# Patient Record
Sex: MALE | Race: WHITE | NOT HISPANIC OR LATINO | Employment: UNEMPLOYED | URBAN - METROPOLITAN AREA
[De-identification: names, ages, dates, MRNs, and addresses within clinical notes are randomized per-mention and may not be internally consistent; named-entity substitution may affect disease eponyms.]

---

## 2021-07-23 ENCOUNTER — HOSPITAL ENCOUNTER (EMERGENCY)
Facility: MEDICAL CENTER | Age: 51
End: 2021-07-23
Attending: EMERGENCY MEDICINE
Payer: COMMERCIAL

## 2021-07-23 VITALS
SYSTOLIC BLOOD PRESSURE: 118 MMHG | WEIGHT: 162.7 LBS | HEIGHT: 71 IN | RESPIRATION RATE: 16 BRPM | TEMPERATURE: 97.4 F | OXYGEN SATURATION: 100 % | HEART RATE: 59 BPM | BODY MASS INDEX: 22.78 KG/M2 | DIASTOLIC BLOOD PRESSURE: 70 MMHG

## 2021-07-23 DIAGNOSIS — R20.2 PARESTHESIAS: ICD-10-CM

## 2021-07-23 LAB
ALBUMIN SERPL BCP-MCNC: 4.8 G/DL (ref 3.2–4.9)
ALBUMIN/GLOB SERPL: 1.7 G/DL
ALP SERPL-CCNC: 66 U/L (ref 30–99)
ALT SERPL-CCNC: 19 U/L (ref 2–50)
ANION GAP SERPL CALC-SCNC: 10 MMOL/L (ref 7–16)
AST SERPL-CCNC: 19 U/L (ref 12–45)
BASOPHILS # BLD AUTO: 0.8 % (ref 0–1.8)
BASOPHILS # BLD: 0.05 K/UL (ref 0–0.12)
BILIRUB SERPL-MCNC: 0.7 MG/DL (ref 0.1–1.5)
BUN SERPL-MCNC: 15 MG/DL (ref 8–22)
CALCIUM SERPL-MCNC: 9.8 MG/DL (ref 8.4–10.2)
CHLORIDE SERPL-SCNC: 102 MMOL/L (ref 96–112)
CO2 SERPL-SCNC: 27 MMOL/L (ref 20–33)
CREAT SERPL-MCNC: 0.68 MG/DL (ref 0.5–1.4)
EOSINOPHIL # BLD AUTO: 0.16 K/UL (ref 0–0.51)
EOSINOPHIL NFR BLD: 2.5 % (ref 0–6.9)
ERYTHROCYTE [DISTWIDTH] IN BLOOD BY AUTOMATED COUNT: 41.5 FL (ref 35.9–50)
GLOBULIN SER CALC-MCNC: 2.9 G/DL (ref 1.9–3.5)
GLUCOSE SERPL-MCNC: 70 MG/DL (ref 65–99)
HCT VFR BLD AUTO: 45.5 % (ref 42–52)
HGB BLD-MCNC: 15.6 G/DL (ref 14–18)
IMM GRANULOCYTES # BLD AUTO: 0.02 K/UL (ref 0–0.11)
IMM GRANULOCYTES NFR BLD AUTO: 0.3 % (ref 0–0.9)
LYMPHOCYTES # BLD AUTO: 2.14 K/UL (ref 1–4.8)
LYMPHOCYTES NFR BLD: 33.1 % (ref 22–41)
MCH RBC QN AUTO: 32 PG (ref 27–33)
MCHC RBC AUTO-ENTMCNC: 34.3 G/DL (ref 33.7–35.3)
MCV RBC AUTO: 93.2 FL (ref 81.4–97.8)
MONOCYTES # BLD AUTO: 0.61 K/UL (ref 0–0.85)
MONOCYTES NFR BLD AUTO: 9.4 % (ref 0–13.4)
NEUTROPHILS # BLD AUTO: 3.49 K/UL (ref 1.82–7.42)
NEUTROPHILS NFR BLD: 53.9 % (ref 44–72)
NRBC # BLD AUTO: 0 K/UL
NRBC BLD-RTO: 0 /100 WBC
PLATELET # BLD AUTO: 211 K/UL (ref 164–446)
PMV BLD AUTO: 10.2 FL (ref 9–12.9)
POTASSIUM SERPL-SCNC: 4 MMOL/L (ref 3.6–5.5)
PROT SERPL-MCNC: 7.7 G/DL (ref 6–8.2)
RBC # BLD AUTO: 4.88 M/UL (ref 4.7–6.1)
SODIUM SERPL-SCNC: 139 MMOL/L (ref 135–145)
WBC # BLD AUTO: 6.5 K/UL (ref 4.8–10.8)

## 2021-07-23 PROCEDURE — 80053 COMPREHEN METABOLIC PANEL: CPT

## 2021-07-23 PROCEDURE — 85025 COMPLETE CBC W/AUTO DIFF WBC: CPT

## 2021-07-23 PROCEDURE — 99283 EMERGENCY DEPT VISIT LOW MDM: CPT

## 2021-07-24 NOTE — ED PROVIDER NOTES
"ED Provider Note    CHIEF COMPLAINT  Chief Complaint   Patient presents with   • Numbness     Reports \"numbness on my R side, which spread to my L side, difficulty speaking\", and near syncope last night which started at approx 2345 last night. When asked if he is having these symptoms now, he states \"I'm tempted to say I'm not. But maybe some difficulty walking\". Ambulates in triage with steady gait. Reports recent anxiety at home. GCS 15.       HPI  Saroj Tipton is a 51 y.o. male who presents with symptoms of numbness last night.  He looked it up and thought he had a TIA.  He was concerned he had a stroke he called 911 they said his stroke score was 0 and left him.  This morning he had some tingling on the right side of his leg right leg right side of his body right arm and then the right half of his left leg arm.  He had some slurred speech had some difficulty walking it has improved he felt dizzy like he was going to fall is never had before he comes in for evaluation.  He has no neck pain no nausea vomiting has minimal headache no diarrhea no fever no chills.  Family history nonpertinent.  He occasionally has alcohol about 1 beer a night 5 nights a week non-smoker no illicit drugs no history of diabetes hypertension.  All other systems negative    REVIEW OF SYSTEMS  See HPI for further details    PAST MEDICAL HISTORY  Past Medical History:   Diagnosis Date   • Diverticulitis        FAMILY HISTORY  History reviewed. No pertinent family history.    SOCIAL HISTORY  Social History     Socioeconomic History   • Marital status: Single     Spouse name: Not on file   • Number of children: Not on file   • Years of education: Not on file   • Highest education level: Not on file   Occupational History   • Not on file   Tobacco Use   • Smoking status: Never Smoker   • Smokeless tobacco: Never Used   Substance and Sexual Activity   • Alcohol use: Yes     Comment: occ   • Drug use: No   • Sexual activity: Not on file " "  Other Topics Concern   • Not on file   Social History Narrative   • Not on file     Social Determinants of Health     Financial Resource Strain:    • Difficulty of Paying Living Expenses:    Food Insecurity:    • Worried About Running Out of Food in the Last Year:    • Ran Out of Food in the Last Year:    Transportation Needs:    • Lack of Transportation (Medical):    • Lack of Transportation (Non-Medical):    Physical Activity:    • Days of Exercise per Week:    • Minutes of Exercise per Session:    Stress:    • Feeling of Stress :    Social Connections:    • Frequency of Communication with Friends and Family:    • Frequency of Social Gatherings with Friends and Family:    • Attends Zoroastrianism Services:    • Active Member of Clubs or Organizations:    • Attends Club or Organization Meetings:    • Marital Status:    Intimate Partner Violence:    • Fear of Current or Ex-Partner:    • Emotionally Abused:    • Physically Abused:    • Sexually Abused:        SURGICAL HISTORY  History reviewed. No pertinent surgical history.    CURRENT MEDICATIONS  Home Medications    **Home medications have not yet been reviewed for this encounter**         ALLERGIES  No Known Allergies    PHYSICAL EXAM  VITAL SIGNS: /75   Pulse 60   Temp 36.3 °C (97.4 °F) (Temporal)   Resp 16   Ht 1.803 m (5' 11\")   Wt 73.8 kg (162 lb 11.2 oz)   SpO2 100%   BMI 22.69 kg/m²     Constitutional: Patient is alert and oriented x3 in  no distress   HENT:   moist mucous membranes  Eyes:   No conjunctivitis  Neck: Trach is midline neck is supple  Cardiovascular: Normal heart rate    Thorax & Lungs: Clear to auscultation  Abdomen: Nontender  Neurologic: Motor 5/5 in the upper lower extremities bilaterally has normal finger-to-nose testing normal pronator drift testing normal tandem walk normal station gait.  Cranial nerves: Pupils equally round react light extraocular movements are intact no facial asymmetry motor sensation no tongue deviation " symmetric palate  Extremities: Full range of motion  Psychiatric: Affect normal, Judgment normal, Mood normal.     Results for orders placed or performed during the hospital encounter of 07/23/21   CBC WITH DIFFERENTIAL   Result Value Ref Range    WBC 6.5 4.8 - 10.8 K/uL    RBC 4.88 4.70 - 6.10 M/uL    Hemoglobin 15.6 14.0 - 18.0 g/dL    Hematocrit 45.5 42.0 - 52.0 %    MCV 93.2 81.4 - 97.8 fL    MCH 32.0 27.0 - 33.0 pg    MCHC 34.3 33.7 - 35.3 g/dL    RDW 41.5 35.9 - 50.0 fL    Platelet Count 211 164 - 446 K/uL    MPV 10.2 9.0 - 12.9 fL    Neutrophils-Polys 53.90 44.00 - 72.00 %    Lymphocytes 33.10 22.00 - 41.00 %    Monocytes 9.40 0.00 - 13.40 %    Eosinophils 2.50 0.00 - 6.90 %    Basophils 0.80 0.00 - 1.80 %    Immature Granulocytes 0.30 0.00 - 0.90 %    Nucleated RBC 0.00 /100 WBC    Neutrophils (Absolute) 3.49 1.82 - 7.42 K/uL    Lymphs (Absolute) 2.14 1.00 - 4.80 K/uL    Monos (Absolute) 0.61 0.00 - 0.85 K/uL    Eos (Absolute) 0.16 0.00 - 0.51 K/uL    Baso (Absolute) 0.05 0.00 - 0.12 K/uL    Immature Granulocytes (abs) 0.02 0.00 - 0.11 K/uL    NRBC (Absolute) 0.00 K/uL   COMP METABOLIC PANEL   Result Value Ref Range    Sodium 139 135 - 145 mmol/L    Potassium 4.0 3.6 - 5.5 mmol/L    Chloride 102 96 - 112 mmol/L    Co2 27 20 - 33 mmol/L    Anion Gap 10.0 7.0 - 16.0    Glucose 70 65 - 99 mg/dL    Bun 15 8 - 22 mg/dL    Creatinine 0.68 0.50 - 1.40 mg/dL    Calcium 9.8 8.4 - 10.2 mg/dL    AST(SGOT) 19 12 - 45 U/L    ALT(SGPT) 19 2 - 50 U/L    Alkaline Phosphatase 66 30 - 99 U/L    Total Bilirubin 0.7 0.1 - 1.5 mg/dL    Albumin 4.8 3.2 - 4.9 g/dL    Total Protein 7.7 6.0 - 8.2 g/dL    Globulin 2.9 1.9 - 3.5 g/dL    A-G Ratio 1.7 g/dL   ESTIMATED GFR   Result Value Ref Range    GFR If African American >60 >60 mL/min/1.73 m 2    GFR If Non African American >60 >60 mL/min/1.73 m 2          COURSE & MEDICAL DECISION MAKING  Pertinent Labs & Imaging studies reviewed. (See chart for details)  Patient has normal lab  work specifically electrolytes he has a normal neuro exam and an atypical presentation and low risk factors.  His ABCD score is 0.  He does not need immediate investigation for CVA.  I would have him follow-up with his primary care physician and given return precautions    FINAL IMPRESSION  1.   2.    1. Paresthesias         3.         Electronically signed by: Varun Bang M.D., 7/23/2021 5:41 PM

## 2023-05-03 ENCOUNTER — OFFICE VISIT (OUTPATIENT)
Dept: URGENT CARE | Facility: CLINIC | Age: 53
End: 2023-05-03
Payer: COMMERCIAL

## 2023-05-03 VITALS
OXYGEN SATURATION: 98 % | DIASTOLIC BLOOD PRESSURE: 60 MMHG | WEIGHT: 140 LBS | HEIGHT: 71 IN | TEMPERATURE: 98.2 F | HEART RATE: 68 BPM | BODY MASS INDEX: 19.6 KG/M2 | SYSTOLIC BLOOD PRESSURE: 108 MMHG | RESPIRATION RATE: 18 BRPM

## 2023-05-03 DIAGNOSIS — R20.0 NUMBNESS AND TINGLING OF LEFT LEG: ICD-10-CM

## 2023-05-03 DIAGNOSIS — R20.2 NUMBNESS AND TINGLING OF LEFT LEG: ICD-10-CM

## 2023-05-03 PROCEDURE — 99203 OFFICE O/P NEW LOW 30 MIN: CPT

## 2023-05-03 RX ORDER — ASPIRIN 325 MG
325 TABLET ORAL EVERY 6 HOURS PRN
COMMUNITY

## 2023-05-03 RX ORDER — OMEPRAZOLE 20 MG/1
20 CAPSULE, DELAYED RELEASE ORAL
COMMUNITY
Start: 2023-03-24

## 2023-05-03 RX ORDER — CALCIUM CARBONATE 500 MG/1
500 TABLET, CHEWABLE ORAL DAILY
COMMUNITY

## 2023-05-03 ASSESSMENT — FIBROSIS 4 INDEX: FIB4 SCORE: 1.12

## 2023-05-04 ENCOUNTER — APPOINTMENT (OUTPATIENT)
Dept: RADIOLOGY | Facility: MEDICAL CENTER | Age: 53
End: 2023-05-04
Attending: EMERGENCY MEDICINE
Payer: COMMERCIAL

## 2023-05-04 ENCOUNTER — HOSPITAL ENCOUNTER (EMERGENCY)
Facility: MEDICAL CENTER | Age: 53
End: 2023-05-04
Attending: EMERGENCY MEDICINE
Payer: COMMERCIAL

## 2023-05-04 VITALS
BODY MASS INDEX: 20.83 KG/M2 | WEIGHT: 148.81 LBS | SYSTOLIC BLOOD PRESSURE: 103 MMHG | OXYGEN SATURATION: 100 % | HEIGHT: 71 IN | HEART RATE: 57 BPM | RESPIRATION RATE: 16 BRPM | DIASTOLIC BLOOD PRESSURE: 70 MMHG | TEMPERATURE: 98.6 F

## 2023-05-04 DIAGNOSIS — R20.2 PARESTHESIA: ICD-10-CM

## 2023-05-04 DIAGNOSIS — I83.93 ASYMPTOMATIC VARICOSE VEINS OF BOTH LOWER EXTREMITIES: ICD-10-CM

## 2023-05-04 LAB
ANION GAP SERPL CALC-SCNC: 7 MMOL/L (ref 7–16)
BASOPHILS # BLD AUTO: 1.5 % (ref 0–1.8)
BASOPHILS # BLD: 0.07 K/UL (ref 0–0.12)
BUN SERPL-MCNC: 17 MG/DL (ref 8–22)
CALCIUM SERPL-MCNC: 9.5 MG/DL (ref 8.4–10.2)
CHLORIDE SERPL-SCNC: 105 MMOL/L (ref 96–112)
CO2 SERPL-SCNC: 29 MMOL/L (ref 20–33)
CREAT SERPL-MCNC: 0.74 MG/DL (ref 0.5–1.4)
EOSINOPHIL # BLD AUTO: 0.19 K/UL (ref 0–0.51)
EOSINOPHIL NFR BLD: 4.2 % (ref 0–6.9)
ERYTHROCYTE [DISTWIDTH] IN BLOOD BY AUTOMATED COUNT: 39.3 FL (ref 35.9–50)
GFR SERPLBLD CREATININE-BSD FMLA CKD-EPI: 108 ML/MIN/1.73 M 2
GLUCOSE SERPL-MCNC: 93 MG/DL (ref 65–99)
HCT VFR BLD AUTO: 38.6 % (ref 42–52)
HGB BLD-MCNC: 13.8 G/DL (ref 14–18)
IMM GRANULOCYTES # BLD AUTO: 0.02 K/UL (ref 0–0.11)
IMM GRANULOCYTES NFR BLD AUTO: 0.4 % (ref 0–0.9)
LYMPHOCYTES # BLD AUTO: 1.47 K/UL (ref 1–4.8)
LYMPHOCYTES NFR BLD: 32.3 % (ref 22–41)
MAGNESIUM SERPL-MCNC: 2.2 MG/DL (ref 1.5–2.5)
MCH RBC QN AUTO: 32.2 PG (ref 27–33)
MCHC RBC AUTO-ENTMCNC: 35.8 G/DL (ref 33.7–35.3)
MCV RBC AUTO: 90 FL (ref 81.4–97.8)
MONOCYTES # BLD AUTO: 0.42 K/UL (ref 0–0.85)
MONOCYTES NFR BLD AUTO: 9.2 % (ref 0–13.4)
NEUTROPHILS # BLD AUTO: 2.38 K/UL (ref 1.82–7.42)
NEUTROPHILS NFR BLD: 52.4 % (ref 44–72)
NRBC # BLD AUTO: 0 K/UL
NRBC BLD-RTO: 0 /100 WBC
PLATELET # BLD AUTO: 178 K/UL (ref 164–446)
PMV BLD AUTO: 10.1 FL (ref 9–12.9)
POTASSIUM SERPL-SCNC: 4.3 MMOL/L (ref 3.6–5.5)
RBC # BLD AUTO: 4.29 M/UL (ref 4.7–6.1)
SODIUM SERPL-SCNC: 141 MMOL/L (ref 135–145)
WBC # BLD AUTO: 4.6 K/UL (ref 4.8–10.8)

## 2023-05-04 PROCEDURE — 99283 EMERGENCY DEPT VISIT LOW MDM: CPT

## 2023-05-04 PROCEDURE — 36415 COLL VENOUS BLD VENIPUNCTURE: CPT

## 2023-05-04 PROCEDURE — 80048 BASIC METABOLIC PNL TOTAL CA: CPT

## 2023-05-04 PROCEDURE — 93970 EXTREMITY STUDY: CPT

## 2023-05-04 PROCEDURE — 85025 COMPLETE CBC W/AUTO DIFF WBC: CPT

## 2023-05-04 PROCEDURE — 83735 ASSAY OF MAGNESIUM: CPT

## 2023-05-04 ASSESSMENT — FIBROSIS 4 INDEX: FIB4 SCORE: 1.12

## 2023-05-04 NOTE — ED PROVIDER NOTES
"ED Provider Note    CHIEF COMPLAINT  Chief Complaint   Patient presents with    Foot Pain     \"Burning in left foot\"   No trauma or injury to foot  Pt states that he was just out doing chores and around 3pm yesterday the burning started   Pt states he has had tingling on and off for a while but it always resolved so never sought out care.   Pain 3/10 in left foot         EXTERNAL RECORDS REVIEWED  Outpatient Notes patient was seen by nurse practitioner at the urgent care yesterday  For complaint of numbness and tingling to left foot.  It was reported that patient is being followed by his primary care physician for this issue as well and had an ultrasound scheduled for sometime within the next 1 week.    HPI/ROS  LIMITATION TO HISTORY   Select: : None  OUTSIDE HISTORIAN(S):  none    Saroj Tipton is a 53 y.o. male who presents to the ER with complaint of intermittent tingling to bilateral heels, left greater than right, for several months.  He also is worried about some varicose veins which he has on his legs, left greater than right, which have been present for the last month or so.  He says he is worried he has \"circulation issues\" because of the varicose veins and the tingling in the heels.  He has seen his primary care physician for the varicose veins and the tingling in his heels and he was referred to the [place] [facility] and is scheduled to have an ultrasound there on May 10.  However, he says he cannot wait.  He is worried that if he goes back to work tomorrow he might \"have to call an ambulance because of his circulation issues.\"  Patient has no weakness of his legs.  He has no tingling to the balls of his feet.  He has no tingling to the tops of his feet.  He has no tingling anywhere else on the legs other than the heels.  No tingling to his hands.  No numbness or tingling to the genital area.  No loss of bowel or bladder control.  He says he has had some back pain in the right lumbar spine " "area on and off for 10 years.  Nothing new or different.  No abdominal pain.  He drinks a beer a day.  He is not a smoker.    PAST MEDICAL HISTORY   has a past medical history of Diverticulitis.    SURGICAL HISTORY  patient denies any surgical history    FAMILY HISTORY  History reviewed. No pertinent family history.    SOCIAL HISTORY  Social History     Tobacco Use    Smoking status: Never    Smokeless tobacco: Never   Vaping Use    Vaping Use: Never used   Substance and Sexual Activity    Alcohol use: Yes     Comment: occ    Drug use: No    Sexual activity: Not on file       CURRENT MEDICATIONS  Home Medications       Reviewed by Haley Giraldo R.N. (Registered Nurse) on 05/04/23 at 1155  Med List Status: Not Addressed     Medication Last Dose Status   aspirin (ASA) 325 MG Tab  Active   calcium carbonate (TUMS) 500 MG Chew Tab  Active   hydrocodone-acetaminophen (VICODIN) 5-500 MG TABS  Active   Multiple Vitamin (MULTIVITAMIN ADULT PO)  Active   omeprazole (PRILOSEC) 20 MG delayed-release capsule  Active                    ALLERGIES  No Known Allergies    PHYSICAL EXAM  VITAL SIGNS: /70   Pulse (!) 57   Temp 37 °C (98.6 °F) (Temporal)   Resp 16   Ht 1.803 m (5' 11\")   Wt 67.5 kg (148 lb 13 oz)   SpO2 100%   BMI 20.75 kg/m²    Constitutional:  Well developed, well nourished; No acute distress   HENT: Normocephalic, Atraumatic, Bilateral external ears normal, oropharyngeal examination deferred due to COVID-19 outbreak and lack of current complaint.  Eyes: PERRL, EOMI, Conjunctiva normal, No discharge.   Neck: Normal range of motion, supple, nontender  Lymphatic: No lymphadenopathy noted.   Cardiovascular: Normal heart rate, Normal rhythm, No murmurs, rubs or gallops   Thorax & Lungs: CTA=bilaterally;  No respiratory distress,  No wheezing rales, or rhonchi; No chest tenderness. No crepitus or subQ air  Abdomen: Nontender, no bruit.  Soft.  Good bowel sounds no guarding no rebound, no masses, no pulsatile " mass, no distention  Skin: Warm, Dry, No erythema, No rash.   Back: No tenderness, No CVA tenderness.   Extremities: 2+ bounding palpable dp and pt pulses bilateral LEs;  Nontender; no pretibial edema.  Patient has some visible varicose veins over the anteromedial aspect of the left leg and the calf of the right leg.  There is no pretibial edema.  There is no tenderness to the calves or saphenous.  No swelling or asymmetry to either leg.  Feet are warm and pink.  Good capillary refill.  Neurologic: Alert & oriented x 4, clear speech.  Lower extremity strengths are 5 out of 5 and equal bilaterally with testing of dorsiflexors, plantar flexors, quadriceps and hamstrings.  Patient describes decree sensation to light touch over bilateral heels, left greater than right.  The rest of the foot has normal sensation to light touch throughout.  Normal sensation to light touch to bilateral legs and arms.  Psychiatric: appropriate, normal affect     DIAGNOSTIC STUDIES / PROCEDURES      LABS       RADIOLOGY  I have independently interpreted the diagnostic imaging associated with this visit and am waiting the final reading from the radiologist.     My preliminary interpretation is as follows: ER MD is reviewed the patient's ultrasound.  Veins appear to be compressible.    Radiologist interpretation:   US-EXTREMITY VENOUS LOWER BILAT   Final Result        Vascular Laboratory   CONCLUSIONS   Normal bilateral superficial and deep venous examination of both legs.     COURSE & MEDICAL DECISION MAKING    ED Observation Status? No; Patient does not meet criteria for ED Observation.     INITIAL ASSESSMENT, COURSE AND PLAN  Care Narrative: Patient presents to the ER complaining of tingling to bilateral heels, left greater than right, for the last several months.  He also is worried about some varicose veins which she noticed about a month ago.  He has varicose veins in bilateral legs.  He went to urgent care yesterday with same complaint.   He is scheduled to see [facility] on May 10 and have ultrasounds of the legs done at that time.  The patient is very concerned that because of the varicose veins and the tingling in his heels he does not have good circulation.  The patient has bounding palpable pedal pulses, both DP and PT in bilateral feet.  The feet are warm and pink.  Good capillary refill.  No evidence of arterial compromise or occlusion at this time.  He says the bilateral heel tingling has been coming and going.  He does not have any tingling to the ball of his foot or the midportion of his foot.  He does not have any tingling to the top of his feet.  No tingling to any other part of the leg.  No tingling to the hands.  He has no weakness of his feet or legs.  His strength are all 5 out of 5 and equal bilaterally with testing of dorsiflexors, plantar flexors, quadriceps and hamstrings.  He says he had back pain on and off for 10 years but he does not have any back pain at this time.  No saddle anesthesia or incontinence of bowel or bladder.  At this time no concern for cord compression or cauda equina syndrome.  Patient reports that the tingling in the heels occurs when he stands.  If he sits and elevates his legs, the tingling in the heels go away.  Patient was concerned about his varicose veins and was worried he might have a blood clot.  For this reason ultrasound was performed of bilateral lower extremities.  There is no DVT.  I check some basic labs given the complaint of paresthesia.  Magnesium is normal as is the potassium.  Perhaps the patient has neuroma.  Perhaps he has some calcaneal spurs or osteophytes which is causing pain.  Perhaps he has some plantar fasciitis.  It is also possible that a low back problem could be contributing to his intermittent paresthesias to bilateral heels.  At this time I think the patient is safe and stable for outpatient management and discharge home.  I have asked him to follow back up with his  primary care physician within the next 1 to 2 days.  I offered him gabapentin but he refused stating he would rather talk to his primary care physician about that medication.  He has an appointment upcoming on May 10 with the [place] [facility].  Patient has been given very strict return precautions and discharge instructions and he understands treatment plan and follow-up.        ADDITIONAL PROBLEM LIST  Problem #1: Intermittent tingling to bilateral heels, left greater than right, times several months  Problem #2: Varicose veins bilateral legs x1 month    DISPOSITION AND DISCUSSIONS  I have discussed management of the patient with the following physicians and RODRICK's: None    Discussion of management with other QHP or appropriate source(s): None     Escalation of care considered, and ultimately not performed:IV fluids.  Patient is not febrile.  He is not vomiting.  He is not septic or toxic.  He is not hypotensive.  At this time no IV fluids necessary.    Barriers to care at this time, including but not limited to: None known    Decision tools and prescription drugs considered including, but not limited to: Pain Medications I offered the patient gabapentin for his tingling in bilateral heels, but he declines and said he would prefer to talk to his primary care physician about gabapentin. .    I verified that the patient was wearing a mask and I was wearing appropriate PPE every time I entered the room. The patient's mask was on the patient at all times during my encounter except for a brief view of the oropharynx.     FINAL DIAGNOSIS  1. Paresthesia Acute   2. Asymptomatic varicose veins of both lower extremities         This dictation has been created using voice recognition software. The accuracy of the dictation is limited by the abilities of the software. I expect there may be some errors of grammar and possibly content. I made every attempt to manually correct the errors within my dictation. However, errors  related to voice recognition software may still exist and should be interpreted within the appropriate context.     Electronically signed by: [place] Camargo M.D., 5/4/2023 11:51 AM

## 2023-05-04 NOTE — PROGRESS NOTES
Chief Complaint   Patient presents with    Foot Pain     Lt side of foot, upper leg, burning sensation , had appt with PCP 1 month ago: no changes since then, still painful        HISTORY OF PRESENT ILLNESS: Patient is a pleasant 53 y.o. male who presents to urgent care today increased numbness and tingling to the left foot and leg, burning sensation.  Patient has ongoing issues with both of his legs, noted varicose veins, he is currently being followed by his primary care and has an ultrasound scheduled for 1 week for ongoing issues.    There are no problems to display for this patient.      Allergies:Patient has no known allergies.    Current Outpatient Medications Ordered in Epic   Medication Sig Dispense Refill    aspirin (ASA) 325 MG Tab Take 325 mg by mouth every 6 hours as needed.      calcium carbonate (TUMS) 500 MG Chew Tab Chew 500 mg every day.      Multiple Vitamin (MULTIVITAMIN ADULT PO) Take  by mouth.      omeprazole (PRILOSEC) 20 MG delayed-release capsule Take 20 mg by mouth every day. (Patient not taking: Reported on 5/3/2023)      hydrocodone-acetaminophen (VICODIN) 5-500 MG TABS Take 1-2 Tabs by mouth every 6 hours as needed. pain (Patient not taking: Reported on 5/3/2023) 20 Each 0     No current [facility]-ordered facility-administered medications on file.       Past Medical History:   Diagnosis Date    Diverticulitis        Social History     Tobacco Use    Smoking status: Never    Smokeless tobacco: Never   Vaping Use    Vaping Use: Never used   Substance Use Topics    Alcohol use: Yes     Comment: occ    Drug use: No       No family status information on file.   History reviewed. No pertinent family history.    ROS:  Review of Systems   Constitutional: Negative for fever, chills, weight loss, malaise, and fatigue.   HENT: Negative for ear pain, nosebleeds, congestion, sore throat and neck pain.    Eyes: Negative for vision changes.   Neuro: Negative for headache, sensory changes, weakness,  "seizure, LOC.,  Increased numbness and tingling to the left leg  Cardiovascular: Negative for chest pain, palpitations, orthopnea and leg swelling.  Noted bilateral leg varicose veins  Respiratory: Negative for cough, sputum production, shortness of breath and wheezing.   Gastrointestinal: Negative for abdominal pain, nausea, vomiting or diarrhea.   Genitourinary: Negative for dysuria, urgency and frequency.  Musculoskeletal: Negative for falls, neck pain, back pain, joint pain, myalgias.   Skin: Negative for rash, diaphoresis.     Exam:  /60 (BP Location: Left arm, Patient Position: Sitting, BP Cuff Size: Adult)   Pulse 68   Temp 36.8 °C (98.2 °F) (Temporal)   Resp 18   Ht 1.803 m (5' 11\")   Wt 63.5 kg (140 lb)   SpO2 98%   General: well-nourished, well-developed male in NAD  Head: normocephalic, atraumatic  Eyes: PERRLA, no conjunctival injection, acuity grossly intact, lids normal.  Ears: normal shape and symmetry, no tenderness, no discharge. External canals are without any significant edema or erythema. Tympanic membranes are without any inflammation, no effusion. Gross auditory acuity is intact.  Nose: symmetrical without tenderness, no discharge.  Mouth/Throat: reasonable hygiene, no erythema, exudates or tonsillar enlargement.  Neck: no masses, range of motion within normal limits, no tracheal deviation. No obvious thyroid enlargement.   Lymph: no cervical adenopathy. No supraclavicular adenopathy.   Neuro: alert and oriented. Cranial nerves 1-12 grossly intact. No sensory deficit.   Cardiovascular: regular rate and rhythm. No edema.  Noted varicose veins to both legs, no noted swelling, no noted redness to the back of the left calf  Pulmonary: no distress. Chest is symmetrical with respiration, no wheezes, crackles, or rhonchi.   Abdomen: soft, non-tender, no guarding, no hepatosplenomegaly.  Musculoskeletal: no clubbing, appropriate muscle tone, gait is stable.  Skin: warm, dry, intact, no " clubbing, no cyanosis, no rashes.  No noted discoloration to the left leg or foot, foot does feel mildly cold compared to the right  Psych: appropriate mood, affect, judgement.         Assessment/Plan:  1. Numbness and tingling of left leg  US-EXTREMITY VENOUS LOWER UNILAT LEFT    US-EXTREMITY ARTERY LOWER UNILAT LEFT      This is a 53-year-old male with an ongoing history of varicose veins, he is currently being followed by his primary care provider for this, he has an ultrasound scheduled for potential occlusion within the next week.  He comes in today due to increasing numbness and tingling to his left leg, newly developed burning sensation.  On assessment no noted swelling, no redness, left foot is mildly colder than the right.  Concerns for possible occlusion, ultrasound ordered, patient advised of the potential risk as ultrasound would be scheduled tomorrow, patient advised that if he at all gets worse develops any shortness of breath, foot becomes more painful or any discoloration to his foot, to please seek further evaluation in the emergency room, he verbalized understanding    Supportive care, differential diagnoses, and indications for immediate follow-up discussed with patient.   Pathogenesis of diagnosis discussed including typical length and natural progression.   Instructed to return to clinic or nearest emergency department for any change in condition, further concerns, or worsening of symptoms.  Patient states understanding of the plan of care and discharge instructions.  Instructed to make an appointment, for follow up, with  primary care provider.        Please note that this dictation was created using voice recognition software. I have made every reasonable attempt to correct obvious errors, but I expect that there are errors of grammar and possibly content that I did not discover before finalizing the note.      Asha GALLEGO

## 2023-05-04 NOTE — ED TRIAGE NOTES
"Chief Complaint   Patient presents with    Foot Pain     \"Burning in left foot\"   No trauma or injury to foot  Pt states that he was just out doing chores and around 3pm yesterday the burning started   Pt states he has had tingling on and off for a while but it always resolved so never sought out care.   Pain 3/10 in left foot         /72   Pulse 64   Temp 36.1 °C (97 °F) (Temporal)   Resp 16   Ht 1.803 m (5' 11\")   Wt 67.5 kg (148 lb 13 oz)   SpO2 97%   BMI 20.75 kg/m²     "

## 2023-05-04 NOTE — DISCHARGE INSTRUCTIONS
Return to the ER for any worsening tingling to your feet, for weakness of your feet or legs, for discoloration to your feet or legs, for worsening back pain, radiating pain down your legs, numbness around your genital area, loss of bowel or bladder control, leg pain or swelling, or for any concerns.    Follow-up with your primary care physician within the next 1 to 2 days.    Follow-up at the [facility] as scheduled on May 10.